# Patient Record
Sex: FEMALE | Race: WHITE | NOT HISPANIC OR LATINO | Employment: UNEMPLOYED | URBAN - METROPOLITAN AREA
[De-identification: names, ages, dates, MRNs, and addresses within clinical notes are randomized per-mention and may not be internally consistent; named-entity substitution may affect disease eponyms.]

---

## 2022-11-15 ENCOUNTER — HOSPITAL ENCOUNTER (EMERGENCY)
Facility: HOSPITAL | Age: 22
Discharge: HOME | End: 2022-11-15
Attending: EMERGENCY MEDICINE | Admitting: EMERGENCY MEDICINE

## 2022-11-15 VITALS — RESPIRATION RATE: 17 BRPM | TEMPERATURE: 96.6 F | HEART RATE: 91 BPM | OXYGEN SATURATION: 97 %

## 2022-11-15 DIAGNOSIS — B09 VIRAL EXANTHEM: Primary | ICD-10-CM

## 2022-11-15 PROCEDURE — 87593 ORTHOPOXVIRUS AMP PRB EACH: CPT | Performed by: PHYSICIAN ASSISTANT

## 2022-11-15 PROCEDURE — 99283 EMERGENCY DEPT VISIT LOW MDM: CPT

## 2022-11-15 NOTE — ED NOTES
Pt arrives ambulatory to triage for a rash on her hands, feet ans mouth. Pt first noticed rash yesterday. Rash appears red with little dots. Pt reports she [place] a bowling [place] yesterday.

## 2022-11-15 NOTE — ED PROVIDER NOTES
I supervised care provided by the midlevel provider.   We have discussed this patient's history, physical exam, and treatment plan.  I have reviewed the note and personally saw and examined the patient and agree with the plan of care.   I have seen and evaluated this patient.  She comes here for a rash that she noticed yesterday evening it is very isolated lesions to her hands and fingers and to her feet and one area on her tongue.  They are painful to touch.  Has had no other symptoms.  No fevers or chills or coughs or colds.  Her immunizations are up-to-date.  There is been no fluid draining from these lesions.    GENERAL: not distressed.  Patient looks well in no distress  HENT: nares patent  Head/neck/ face are symmetric without gross deformity or swelling  EYES: no scleral icterus  CV: regular rhythm, regular rate with intact distal pulses  RESPIRATORY: normal effort and no respiratory distress  ABDOMEN: soft and nontender  MUSCULOSKELETAL: no deformity  NEURO: alert and appropriate, moves all extremities, follows commands  SKIN: Patient has very few isolated red elevated lesions to her left and right hand more on the palmar aspect as well as to her right and left toes and isolated distribution as well.  They are tender to palpate.  There is no surrounding erythema.  No petechiae or purpura.  In total there are very few lesions less than 10 but more than 5.  Vital signs and nursing notes reviewed.    Plan we will check a monkey pox PCR test on her.  I suspect that this is a viral exanthem.  Would not surprise me if this is hand-foot-and-mouth disease from coxsackievirus.  She is having no other symptoms and looks very well.  All questions answered at this time.       We are currently under a pandemic from the COVID19 infection.  The patient presented to the emergency department by ambulance or personal vehicle. I followed the current protocols required by Infection Control at [facility] in my  evaluation and treatment of the patient. The patient was wearing a face mask during my evaluation and throughout my encounter. During my whole encounter with this patient I used appropriate personal protective equipment.  This equipment consisted of eye protection, facemask, gown, and gloves.  I applied this equipment before entering the room.           Lcai Lora MD  11/15/22 [id number]

## 2022-11-15 NOTE — ED PROVIDER NOTES
EMERGENCY DEPARTMENT ENCOUNTER    Room Number:  05/05  Date of encounter:  11/15/2022  PCP: System, Provider Not In  Historian: Patient  Full history not obtainable due to: None    HPI:  Chief Complaint: Rash    Context: Jeri Danielle is a 22 y.o. female who presents to the ED c/o a painful rash to the palms, soles of her feet, tongue.  Symptoms started yesterday morning after spending the evening prior at a bowling alley.  The patient is concerned that she may have contracted something from the bowling balls that she was touching or the pool sticks that she was using at the pool table.  She has not developed fever, chills, nausea, vomiting.  No other contacts have had the symptoms around her.      MEDICAL RECORD REVIEW:    No pertinent records in the accessible EMR      PAST MEDICAL HISTORY    Active Ambulatory Problems     Diagnosis Date Noted   • No Active Ambulatory Problems     Resolved Ambulatory Problems     Diagnosis Date Noted   • No Resolved Ambulatory Problems     No Additional Past Medical History         PAST SURGICAL HISTORY  History reviewed. No pertinent surgical history.      FAMILY HISTORY  History reviewed. No pertinent family history.      SOCIAL HISTORY  Social History     Socioeconomic History   • Marital status: Single         ALLERGIES  Patient has no known allergies.        REVIEW OF SYSTEMS    All systems reviewed and marked as negative except as listed in HPI     PHYSICAL EXAM    I have reviewed the triage vital signs and nursing notes.    ED Triage Vitals [11/15/22 0838]   Temp Heart Rate Resp BP SpO2   96.6 °F (35.9 °C) 91 17 -- 97 %      Temp src Heart Rate Source Patient Position BP Location FiO2 (%)   Tympanic Monitor -- -- --       Physical Exam  Constitutional:       General: She is not in acute distress.     Appearance: She is well-developed.   HENT:      Head: Normocephalic and atraumatic.   Eyes:      General: No scleral icterus.     Conjunctiva/sclera: Conjunctivae normal.   Neck:       Trachea: No tracheal deviation.   Cardiovascular:      Rate and Rhythm: Normal rate and regular rhythm.   Pulmonary:      Effort: Pulmonary effort is normal.      Breath sounds: Normal breath sounds.   Abdominal:      Palpations: Abdomen is soft.      Tenderness: There is no abdominal tenderness.   Musculoskeletal:         General: No deformity.      Cervical back: Normal range of motion.   Lymphadenopathy:      Cervical: No cervical adenopathy.   Skin:     General: Skin is warm and dry.      Comments: Several very small mildly erythematous raised lesions to a few of the fingers and on the palms of the hand as well as the plantar surface of the great toes and soles of the feet.  No obvious surrounding erythema, warmth, tenderness, drainage.   Neurological:      Mental Status: She is alert and oriented to person, place, and time.   Psychiatric:         Behavior: Behavior normal.         Vital signs and nursing notes reviewed.            LAB RESULTS  No results found for this or any previous visit (from the past 24 hour(s)).    Ordered the above labs and independently reviewed the results.        RADIOLOGY  No Radiology Exams Resulted Within Past 24 Hours    I ordered the above noted radiological studies. Independently reviewed by me and discussed with radiologist.  See dictation above for official radiology interpretation.      PROCEDURES    Procedures        MEDICATIONS GIVEN IN ER    Medications - No data to display      PROGRESS, DATA ANALYSIS, CONSULTS, AND MEDICAL DECISION MAKING    All labs have been independently reviewed by me.  All radiology studies have been reviewed by me.   EKG's independently reviewed by me.  Discussion below represents my analysis of pertinent findings related to patient's condition, differential diagnosis, treatment plan and final disposition.    DIFFERENTIAL DIAGNOSIS INCLUDE BUT NOT LIMITED TO:     Zoster, coxsackievirus, monkey proximal, folliculitis         AS OF 08:58 EST  VITALS:    BP -    HR - 91  TEMP - 96.6 °F (35.9 °C) (Tympanic)  02 SATS - 97%    0859 I rechecked the patient.  I discussed the patient's labs, diagnosis, and plan for discharge.  A repeat exam reveals no new worrisome changes from my initial exam findings.  The patient understands that the fact that they are being discharged does not denote that nothing is abnormal, it indicates that no clinical emergency is present and that they must follow-up as directed in order to properly maintain their health.  Follow-up instructions (specifically listed below) and return to ER precautions were given at this time.  I specifically instructed the patient to follow-up with their PCP.  The patient understands and agrees with the plan, and is ready for discharge.  All questions answered.      DIAGNOSIS  Final diagnoses:   Viral exanthem         DISPOSITION  D/c    Pt masked in first look. I wore a surgical mask throughout my encounters with the pt. I performed hand hygiene on entry into the pt room and upon exit.     Dictated utilizing Dragon dictation     Note Disclaimer: At [facility], we believe that sharing information builds trust and better relationships. You are receiving this note because you recently visited [facility]. It is possible you will see health information before a provider has talked with you about it. This kind of information can be easy to misunderstand. To help you fully understand what it means for your health, we urge you to discuss this note with your provider.      Irwin Damian PA  11/15/22 [id number]

## 2022-11-16 LAB — NONVAR ORTHPX DNA SPEC QL NAA+PROBE: NOT DETECTED
